# Patient Record
Sex: FEMALE | Race: WHITE
[De-identification: names, ages, dates, MRNs, and addresses within clinical notes are randomized per-mention and may not be internally consistent; named-entity substitution may affect disease eponyms.]

---

## 2018-04-13 ENCOUNTER — HOSPITAL ENCOUNTER (EMERGENCY)
Dept: HOSPITAL 56 - MW.ED | Age: 48
Discharge: HOME | End: 2018-04-13
Payer: COMMERCIAL

## 2018-04-13 VITALS — SYSTOLIC BLOOD PRESSURE: 101 MMHG | DIASTOLIC BLOOD PRESSURE: 72 MMHG

## 2018-04-13 DIAGNOSIS — Z88.0: ICD-10-CM

## 2018-04-13 DIAGNOSIS — S67.192A: Primary | ICD-10-CM

## 2018-04-13 DIAGNOSIS — Z79.899: ICD-10-CM
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
## 2018-04-13 DIAGNOSIS — E78.00: ICD-10-CM

## 2018-04-13 DIAGNOSIS — Z23: ICD-10-CM

## 2018-04-13 DIAGNOSIS — S61.212A: ICD-10-CM

## 2018-04-13 PROCEDURE — 12001 RPR S/N/AX/GEN/TRNK 2.5CM/<: CPT

## 2018-04-13 PROCEDURE — 99283 EMERGENCY DEPT VISIT LOW MDM: CPT

## 2018-04-13 PROCEDURE — 73140 X-RAY EXAM OF FINGER(S): CPT

## 2018-04-13 PROCEDURE — 90715 TDAP VACCINE 7 YRS/> IM: CPT

## 2018-04-13 PROCEDURE — 90471 IMMUNIZATION ADMIN: CPT

## 2018-04-13 NOTE — CR
EXAMINATION: Right hand, third digit

 

HISTORY: Injury

 

COMPARISON: None

 

TECHNIQUE: 3 views

 

FINDINGS: There is a tiny ossific density noted along the dorsal plate of the distal third DIP joint.
 Otherwise the visualized osseous structures appear intact. Bone mineralization appears normal. Mild 
generalized soft tissue swelling within the third digit.

 

IMPRESSION: 

1. Tiny ossific density at the dorsal plate third DIP joint.. This could represent a degenerative pancho
cification or disrupted osteophyte. This unlikely represent dorsal plate avulsion injury given the la
ck of flexion at the DIP joint.

## 2018-04-13 NOTE — EDM.PDOC
ED HPI GENERAL MEDICAL PROBLEM





- General


Chief Complaint: Laceration


Stated Complaint: RIGHT MIDDLE FINGER CUT WORK RELATED


Time Seen by Provider: 04/13/18 10:21


Source of Information: Reports: Patient


History Limitations: Reports: No Limitations





- History of Present Illness


INITIAL COMMENTS - FREE TEXT/NARRATIVE: 


HISTORY AND PHYSICAL:





History of present illness:


Patient is a 48-year-old female who presents to the emergency room with 

complaints of a crush injury to her right middle digit. She states she slammed 

it with a washing machine lid. Did result in a "Z" shape laceration to the pad 

of her finger. She is currently complaining of pain and throbbing to the 

affected extremity. Unsure of her last tetanus.





Review of systems: 


As per history of present illness and below otherwise all systems reviewed and 

negative.





Past medical history: 


As per history of present illness and as reviewed below otherwise 

noncontributory.





Surgical history: 


As per history of present illness and as reviewed below otherwise 

noncontributory.





Social history: 


No reported history of drug or alcohol abuse.





Family history: 


As per history of present illness and as reviewed below otherwise 

noncontributory.





Physical exam:


General: Well-developed and well-nourished 48-year-old female. Alert and 

oriented. Nontoxic appearing and in no acute distress.


HEENT: Atraumatic, normocephalic, pupils equal and reactive bilaterally, 

negative for conjunctival pallor or scleral icterus, mucous membranes moist, 

throat clear, neck supple, nontender, trachea midline. No drooling or trismus 

noted. No meningeal signs


Lungs: Clear to auscultation, breath sounds equal bilaterally, chest nontender.


Heart: S1S2, regular rate and rhythm without overt murmur


Abdomen: Soft, nondistended, nontender. Negative for masses or 

hepatosplenomegaly. Negative for costovertebral tenderness.


Pelvis: Stable nontender.


Genitourinary: Deferred.


Rectal: Deferred.


Skin: 0.5 CM "Z" shape laceration to pad of right middle distal digit. No 

nailbed involvement. Otherwise skin is intact, warm, dry. No lesions or rashes 

noted.


Extremities: Crush injury to the right middle digit, strong radial pulse. 

Capillary refill less than 3 seconds. Moves all other extremities without 

difficulty or deficits. She is negative for cords or calf pain. Neurovascular 

unremarkable.


Neuro: Awake, alert, oriented. Cranial nerves II through XII unremarkable. 

Cerebellum unremarkable. Motor and sensory unremarkable throughout. Exam 

nonfocal.





Notes:


Patient states that she has been immense pain, I will give her one tablet Norco 

while she is waiting for x-rays. She is too tender to check the laceration for 

depth, it does appear to be superficial I am unable to spread it open. We'll 

reevaluate once she has better managed pain.





Diagnostics:


X-ray





Therapeutics:


Norco, Tdap, 1% lidocaine





Impression: 


Crush injury, right middle digit


Finger Laceration





Plan:


1. Please keep the area clean and dry. Sutures to be removed in about 7 days.


2. Continue to monitor for signs of infection.


3. Tylenol or/and ibuprofen as needed for pain management. You may use the 

tramadol (#8) as needed for moderate to severe pain. This medication may cause 

drowsiness a do not take it will driving or needing to be functioning outside 

the house.


4. Follow-up with the general hand surgeon or your primary care provider in the 

next 1-2 days. Return to the ED as needed and as discussed.





Definitive disposition and diagnosis as appropriate pending reevaluation and 

review of above.





Onset: Today


Duration: Minutes:


Location: Reports: Upper Extremity, Right


  ** right middle finger


Pain Score (Numeric/FACES): 7





- Related Data


 Allergies











Allergy/AdvReac Type Severity Reaction Status Date / Time


 


Penicillins Allergy  Hives Verified 03/14/17 06:47











Home Meds: 


 Home Meds





Ondansetron [Zofran ODT] 4 mg PO Q8H PRN #10 tab.dis 03/14/17 [Rx]


Venlafaxine HCl [Venlafaxine ER] 75 mg PO DAILY 04/13/18 [History]


atorvaSTATin [Lipitor] 10 mg PO DAILY 04/13/18 [History]











Past Medical History


HEENT History: Reports: None


Cardiovascular History: Reports: High Cholesterol


Respiratory History: Reports: None


Gastrointestinal History: Reports: None


Genitourinary History: Reports: Renal Calculus


OB/GYN History: Reports: None


Musculoskeletal History: Reports: None


Neurological History: Reports: None


Psychiatric History: Reports: Anxiety


Endocrine/Metabolic History: Reports: None


Hematologic History: Reports: None


Immunologic History: Reports: None


Oncologic (Cancer) History: Reports: Other (See Below)


Other Oncologic History: pituitary tumor


Dermatologic History: Reports: None





- Infectious Disease History


Infectious Disease History: Reports: None





- Past Surgical History


Head Surgeries/Procedures: Reports: None


HEENT Surgical History: Reports: None


Cardiovascular Surgical History: Reports: None


Respiratory Surgical History: Reports: None


Female  Surgical History: Reports: Tubal Ligation


Oncologic Surgical History: Reports: None





Social & Family History





- Family History


Family Medical History: Noncontributory





- Tobacco Use


Smoking Status *Q: Never Smoker


Second Hand Smoke Exposure: No





- Caffeine Use


Caffeine Use: Reports: Tea





- Recreational Drug Use


Recreational Drug Use: No





ED ROS GENERAL





- Review of Systems


Review Of Systems: ROS reveals no pertinent complaints other than HPI.





ED EXAM, SKIN/RASH


Exam: See Below (See dictation)





ED SKIN PROCEDURES





- Laceration/Wound Repair


  ** right middle digit


Lac/Wound length In cm: 0.5


Appearance: Subcutaneous


Anesthetic Type: Local


Local Anesthesia - Lidocaine (Xylocaine): 1% Plain


Local Anesthetic Volume: 4cc


Skin Prep: Chlorhexidine (Hibiciens), Saline, Sterile Drape


Saline Irrigation (cc's): 20


Exploration/Debridement/Repair: Wound Explored, In a Bloodless Field, No 

Foreign Material Found


Closed with: Sutures


Suture Size: 4-0


# of Sutures: 4


Suture Type: Nylon


Drain Placement: No


Sterile Dressing Applied: Provider


Tetanus Status Addressed: Yes


Complications: No


Progress/Comments: 


Bulky dressing applied, nonstick with Bacitracin





Course





- Vital Signs


Last Recorded V/S: 


 Last Vital Signs











Temp  97.8 F   04/13/18 10:22


 


Pulse  87   04/13/18 10:22


 


Resp  18   04/13/18 10:22


 


BP  101/72   04/13/18 10:22


 


Pulse Ox  96   04/13/18 10:22














- Orders/Labs/Meds


Orders: 


 Active Orders 24 hr











 Category Date Time Status


 


 Vaccines to be Administered [RC] PER UNIT ROUTINE Care  04/13/18 10:34 Active











Meds: 


Medications














Discontinued Medications














Generic Name Dose Route Start Last Admin





  Trade Name Elder  PRN Reason Stop Dose Admin


 


Hydrocodone Bitart/Acetaminophen  1 tab  04/13/18 10:33  04/13/18 11:10





  Norco 325-5 Mg  PO  04/13/18 10:34  1 tab





  ONETIME ONE   Administration





     





     





     





     


 


Bacitracin  1 dose  04/13/18 11:28  04/13/18 11:32





  Bacitracin Oint 1 Gm  TOP  04/13/18 11:29  1 dose





  ONETIME ONE   Administration





     





     





     





     


 


Diphtheria/Tetanus/Acell Pertussis  0.5 ml  04/13/18 10:33  04/13/18 11:10





  Adacel  IM  04/13/18 10:34  0.5 ml





  .ONCE ONE   Administration





     





     





     





     


 


Lidocaine HCl  20 ml  04/13/18 10:33  04/13/18 11:28





  Xylocaine 1%  INJECT  04/13/18 10:34  20 ml





  ONETIME ONE   Administration





     





     





     





     














Departure





- Departure


Time of Disposition: 11:39


Disposition: Home, Self-Care 01


Clinical Impression: 


Crush injury to finger


Qualifiers:


 Encounter type: initial encounter Qualified Code(s): S67.10XA - Crushing 

injury of unspecified finger(s), initial encounter





Finger laceration


Qualifiers:


 Encounter type: initial encounter Finger: middle finger Damage to nail status: 

without damage Foreign body presence: without foreign body Laterality: right 

Qualified Code(s): S61.212A - Laceration without foreign body of right middle 

finger without damage to nail, initial encounter








- Discharge Information


Instructions:  Laceration Care, Adult, Easy-to-Read


Referrals: 


Rhianna Devries MD [Primary Care Provider] - 


Forms:  ED Department Discharge


Additional Instructions: 


The following information is given to patients seen in the emergency department 

who are being discharged to home. This information is to outline your options 

for follow-up care. We provide all patients seen in our emergency department 

with a follow-up referral.





The need for follow-up, as well as the timing and circumstances, are variable 

depending upon the specifics of your emergency department visit.





If you don't have a primary care physician on staff, we will provide you with a 

referral. We always advise you to contact your personal physician following an 

emergency department visit to inform them of the circumstance of the visit and 

for follow-up with them and/or the need for any referrals to a consulting 

specialist.





The emergency department will also refer you to a specialist when appropriate. 

This referral assures that you have the opportunity for follow-up care with a 

specialist. All of these measure are taken in an effort to provide you with 

optimal care, which includes your follow-up.





Under all circumstances we always encourage you to contact your private 

physician who remains a resource for coordinating your care. When calling for 

follow-up care, please make the office aware that this follow-up is from your 

recent emergency room visit. If for any reason you are refused follow-up, 

please contact the [facility] Emergency 

Department at [phone] and asked to speak to the emergency department 

charge nurse.





[facility]


Primary Care


[address] [place] [id number]


Phone: [phone]


Fax: [phone]





1. Please keep the area clean and dry. Sutures to be removed in about 7 days.


2. Continue to monitor for signs of infection.


3. Tylenol or/and ibuprofen as needed for pain management. You may use the 

tramadol as needed for moderate to severe pain. This medication may cause 

drowsiness a do not take it will driving or needing to be functioning outside 

the house.


4. Follow-up with the general hand surgeon or your primary care provider in the 

next 1-2 days. Return to the ED as needed and as discussed.





- My Orders


Last 24 Hours: 


My Active Orders





04/13/18 10:34


Vaccines to be Administered [RC] PER UNIT ROUTINE 














- Assessment/Plan


Last 24 Hours: 


My Active Orders





04/13/18 10:34


Vaccines to be Administered [RC] PER UNIT ROUTINE